# Patient Record
Sex: FEMALE | Race: WHITE
[De-identification: names, ages, dates, MRNs, and addresses within clinical notes are randomized per-mention and may not be internally consistent; named-entity substitution may affect disease eponyms.]

---

## 2022-01-24 ENCOUNTER — HOSPITAL ENCOUNTER (EMERGENCY)
Dept: HOSPITAL 33 - ED | Age: 27
Discharge: HOME | End: 2022-01-24
Payer: SELF-PAY

## 2022-01-24 ENCOUNTER — HOSPITAL ENCOUNTER (OUTPATIENT)
Dept: HOSPITAL 33 - OB | Age: 27
Setting detail: OBSERVATION
Discharge: HOME | End: 2022-01-24
Attending: OBSTETRICS & GYNECOLOGY | Admitting: OBSTETRICS & GYNECOLOGY
Payer: SELF-PAY

## 2022-01-24 VITALS — OXYGEN SATURATION: 98 %

## 2022-01-24 VITALS — DIASTOLIC BLOOD PRESSURE: 66 MMHG | SYSTOLIC BLOOD PRESSURE: 114 MMHG

## 2022-01-24 VITALS — OXYGEN SATURATION: 96 % | DIASTOLIC BLOOD PRESSURE: 59 MMHG | HEART RATE: 86 BPM | SYSTOLIC BLOOD PRESSURE: 121 MMHG

## 2022-01-24 VITALS — HEART RATE: 104 BPM

## 2022-01-24 DIAGNOSIS — R06.02: Primary | ICD-10-CM

## 2022-01-24 DIAGNOSIS — Z34.02: Primary | ICD-10-CM

## 2022-01-24 DIAGNOSIS — Z3A.26: ICD-10-CM

## 2022-01-24 DIAGNOSIS — Z33.1: ICD-10-CM

## 2022-01-24 PROCEDURE — 99283 EMERGENCY DEPT VISIT LOW MDM: CPT

## 2022-01-24 PROCEDURE — G0378 HOSPITAL OBSERVATION PER HR: HCPCS

## 2022-01-24 PROCEDURE — 94640 AIRWAY INHALATION TREATMENT: CPT

## 2022-01-24 NOTE — ERPHSYRPT
- History of Present Illness


Time Seen by Provider: 01/24/22 18:55


Source: patient


Exam Limitations: no limitations


Patient Subjective Stated Complaint: "I'm having some trouble breathing."


Triage Nursing Assessment: patient reported that she was seen at [facility] on 

1/21/22 for difficulty breathing. She reported having a CT scan and was 

diagnosed with viral pneumonia and given a roung of IV antibiotics at  ER. She

reported that since then she has not had any fevers within the last 48hrs. She 

denied exertional dyspnea, orthopnea, or productive cough. She denied chest 

pain, headache, dizziness, flank pain, N/V/D, dysuria, heamturia.  Pupils 3mm 

bilateral. Neck supple without JVD. Symmetrical chest expansion. heart tones 

S1/S2 RRR without extra sounds. Lungs vesicular with adequate airflow and no 

adventitious sounds. Peripheral pulses +3 bilateral. Gait steady without 

complications.  Ambulatory pulse oximetry was 99% and stable without worsening 

dyspnea with exertion.


Physician History: 


Patient is a 26-year-old female presents to our ED as a referral from her OB/GYN

physician for evaluation of shortness of breath.  Patient states she was 

diagnosed with viral pneumonia 3 days ago at [facility].  She was 

treated with antibiotics.  However patient has no fever.  Shortness of breath is

intermittent.  Patient states that she feels her chest is somewhat tight and 

symptoms resolved.  Patient feels very minimal shortness of breath.  Patient is 

currently 27 weeks pregnant.  Patient ambulated in our ED.  During triage it was

observed that patient had an exertional O2 sat of 97%.  Patient otherwise 

asymptomatic.





Timing/Duration: day(s) (4 days)


Severity: moderate


Modifying Factors: Improves With: nothing


Associated Symptoms: denies symptoms


Allergies/Adverse Reactions: 








No Known Drug Allergies Allergy (Verified 01/24/22 18:49)


   





Home Medications: 








Buspirone HCl 5 mg*** [Buspar 5 mg***] 10 mg PO DAILY 01/24/22 [History]





Hx Tetanus, Diphtheria Vaccination/Date Given: No


Hx Influenza Vaccination/Date Given: No





Travel Risk





- International Travel


Have you traveled outside of the country in past 3 weeks: No





- Coronavirus Screening


Are you exhibiting any of the following symptoms?: Yes


Symptoms: Shortness of Breath


Close contact with a COVID-19 positive Pt in past 14-21 Days: No





- Vaccine Status


Have you recieved a Covid-19 vaccination: No





- Review of Systems


Constitutional: No Symptoms, No Fever, No Chills


Eyes: No Symptoms


Ears, Nose, & Throat: No Symptoms


Respiratory: No Symptoms, No Cough, No Dyspnea


Cardiac: No Symptoms, No Chest Pain, No Edema, No Syncope


Abdominal/Gastrointestinal: No Symptoms, No Abdominal Pain, No Nausea, No 

Vomiting, No Diarrhea


Genitourinary Symptoms: No Symptoms, No Dysuria


Musculoskeletal: No Symptoms, No Back Pain, No Neck Pain


Skin: No Symptoms, No Rash


Neurological: No Symptoms, No Dizziness, No Focal Weakness, No Sensory Changes


Psychological: No Symptoms


Endocrine: No Symptoms


Hematologic/Lymphatic: No Symptoms


Immunological/Allergic: No Symptoms


All Other Systems: Reviewed and Negative





- Past Medical History


Pertinent Past Medical History: No





- Past Surgical History


Past Surgical History: Yes





- Social History


Smoking Status: Never smoker


Exposure to second hand smoke: No


Drug Use: none


Patient Lives Alone: No





- Female History


Hx Pregnant Now: Yes


Expected Date of Delivery: 04/24/22





- Nursing Vital Signs


Nursing Vital Signs: 





                               Initial Vital Signs











Pulse Rate  18 L  01/24/22 18:50


 


Respiratory Rate  18   01/24/22 18:50


 


Blood Pressure  132/86   01/24/22 18:50


 


O2 Sat by Pulse Oximetry  98   01/24/22 18:50








                                   Pain Scale











Pain Intensity                 0

















- Physical Exam


General Appearance: no apparent distress, alert


Eye Exam: PERRL/EOMI, eyes nml inspection


Ears, Nose, Throat Exam: normal ENT inspection, TMs normal, pharynx normal, 

moist mucous membranes


Neck Exam: normal inspection, non-tender, supple, full range of motion


Respiratory Exam: normal breath sounds, lungs clear, airway intact, No 

respiratory distress


Cardiovascular Exam: regular rate/rhythm, normal heart sounds, normal peripheral

 pulses


Gastrointestinal/Abdomen Exam: soft, normal bowel sounds, No tenderness, No mass


Back Exam: normal inspection, normal range of motion, No CVA tenderness, No 

vertebral tenderness


Extremity Exam: normal inspection, normal range of motion, pelvis stable, other 

(Negative Homans' sign bilaterally.)


Neurologic Exam: alert, oriented x 3, cooperative, normal mood/affect, nml 

cerebellar function, nml station & gait, sensation nml, No motor deficits


Skin Exam: normal color, warm, dry, No rash


Lymphatic Exam: No adenopathy


**SpO2 Interpretation**: normal


SpO2: 98


O2 Delivery: Room Air





- Course


Nursing assessment & vital signs reviewed: Yes


Ordered Tests: 





Medication Summary














Discontinued Medications














Generic Name Dose Route Start Last Admin





  Trade Name Toño  PRN Reason Stop Dose Admin


 


Albuterol/Ipratropium  3 ml  01/24/22 19:14 





  Ipratropium/Albuterol Sulfate 3 Ml Ampul.Neb    01/24/22 19:15 





  STAT ONE  














- Progress


Progress: improved


Progress Note: 


Patient reassessed.  She feels well.  Lungs are clear.  We treated patient with 

a trial of albuterol.  Symptoms significantly improved.  Case discussed with Dr. Chappell.  We will send patient home with a prescription for Ventolin albuterol 

inhaler.  No indication for further work-up.  Patient had a CAT scan done 3 days

 ago.  There will be no benefit of doing an x-ray today a repeat CAT scan.  

Patient's vitals are normal.  O2 sat is 98% during exertion and at rest.  

Shortness of breath may be due to her recently diagnosed viral pneumonia and the

 fact that she is currently pregnant.  Patient has no calf pain.  No concerns 

for DVT at this time.  Home test negative.  Will discharge home.  Patient agrees

 to follow-up with her primary care doctor/OB physician within 48 hours for 

evaluation.  Patient that she is ready for discharge.





Portions of this note were created with voice recognition technology.  There may

 be grammatical, spelling, punctuation or sound alike errors


01/24/22 19:22


Reviewed CTA chest performed on 1/21/2022 at [facility].  No thoracic

 aortic aneurysm or dissection.  Pulmonary arteries are adequately opacified.  

No pulmonary artery filling defect to suggest pulmonary embolus.  Minimal 

dependent atelectasis.  Mild respiratory motion artifact.  No airspace 

consolidation.  No pulmonary mass or suspicious calcified pulmonary nodule.  No 

pleural effusion or pneumothorax.  No lymphadenopathy.  Heart size is normal 

with no pericardial effusion.  No adenopathy.  No acute abnormality in the 

visualized upper abdomen.  Thoracic vertebrae heights are intact.  No vertebral 

compression fracture or traumatic malalignment.


01/24/22 19:25





Discussed with Dr.: Maximiliano


Will see patient in: office


Counseled pt/family regarding: diagnosis, need for follow-up, rad results





- Departure


Departure Disposition: Home


Clinical Impression: 


 Shortness of breath





Condition: Stable


Critical Care Time: No


Referrals: 


TIMBO AGUILA [Primary Care Provider] - Follow up/PCP as directed


Additional Instructions: 


Discharge/Care Plan





JACINTO GALLEGOS was seen on 01/24/22 in the Emergency Room. The patient was 

counseled regarding Diagnosis,Lab results, Imaging studies, need for follow up 

and when to return to the Emergency Room.





Prescriptions given:





Discharge Note





I have spoken with the patient and/or caregivers. I have explained the patient's

condition, diagnosis and treatment plan based on the information available to me

at this time. I have answered the patient's and/or caregiver's questions and 

addressed any concerns. The patient and/or caregivers have as good understanding

of the patient's diagnosis, condition and treatment plan as can be expected at 

this point. The vital signs have been stable. The patient's condition is stable 

and appropriate for discharge from the emergency department.





The patient will pursue further outpatient evaluation with the primary care phys

ician or other designated or consulting physician as outlined in the discharge 

instructions. The patient and/or caregivers are agreeable to this plan of care 

and follow-up instructions have been explained in detail. The patient and/or 

caregivers have received these instruction. The patient/and or caregivers are 

aware that any significant change in condition or worsening of symptoms should 

prompt an immediate return to this or the closest emergency department or call 

[phone].

## 2022-03-01 ENCOUNTER — HOSPITAL ENCOUNTER (OUTPATIENT)
Dept: HOSPITAL 33 - RAD | Age: 27
Setting detail: OBSERVATION
Discharge: HOME | End: 2022-03-01
Attending: OBSTETRICS & GYNECOLOGY | Admitting: OBSTETRICS & GYNECOLOGY
Payer: SELF-PAY

## 2022-03-01 DIAGNOSIS — Z34.03: Primary | ICD-10-CM

## 2022-03-01 DIAGNOSIS — Z3A.32: ICD-10-CM

## 2022-03-01 PROCEDURE — 76816 OB US FOLLOW-UP PER FETUS: CPT

## 2022-03-01 PROCEDURE — 59025 FETAL NON-STRESS TEST: CPT

## 2022-03-01 PROCEDURE — G0378 HOSPITAL OBSERVATION PER HR: HCPCS

## 2022-03-01 NOTE — XRAY
Indication: Fetal growth.



2-dimensional OB ultrasound performed.



Comparison: December 14, 2021



Again single intrauterine pregnancy now in cephalic presentation.  Fetal heart

rate 131 BPM.  Fetal anatomy previously documented.  Posterior fundal placenta

without abruption/previa.



BPD measures 8.38 cm corresponding to 33 weeks 5 days.

HC measures 28.70 cm corresponding to 31 weeks 4 days.

AC measures 29.30 cm corresponding to 33 weeks 2 days.

FL measures 6.16 cm corresponding to 32 weeks 0 days.

Estimated fetal weight 4 lb. 8 oz., +/- 11 ounces. Approximately 55 percentile.

ZBIGNIEW is 14.5 cm.



Impression: Single viable intrauterine pregnancy with mean gestational age 32

weeks 5 days.  Normal progression of pregnancy.

## 2022-03-08 ENCOUNTER — HOSPITAL ENCOUNTER (OUTPATIENT)
Dept: HOSPITAL 33 - OB | Age: 27
Setting detail: OBSERVATION
Discharge: HOME | End: 2022-03-08
Attending: OBSTETRICS & GYNECOLOGY | Admitting: OBSTETRICS & GYNECOLOGY
Payer: SELF-PAY

## 2022-03-08 VITALS — DIASTOLIC BLOOD PRESSURE: 79 MMHG | OXYGEN SATURATION: 97 % | SYSTOLIC BLOOD PRESSURE: 136 MMHG | HEART RATE: 93 BPM

## 2022-03-08 DIAGNOSIS — Z3A.33: ICD-10-CM

## 2022-03-08 DIAGNOSIS — Z34.03: Primary | ICD-10-CM

## 2022-03-08 PROCEDURE — 76818 FETAL BIOPHYS PROFILE W/NST: CPT

## 2022-03-08 PROCEDURE — 59025 FETAL NON-STRESS TEST: CPT

## 2022-03-08 PROCEDURE — G0378 HOSPITAL OBSERVATION PER HR: HCPCS

## 2022-03-08 NOTE — XRAY
Indication: Fetal well-being.



Ultrasound biophysical profile study performed.



Comparison: None



Single intrauterine pregnancy currently in cephalic presentation with fetal

heart rate 123 BPM.  Four-quadrant ZBIGNIEW is 13.4 cm, largest pocket 5.7 cm.



2 points given for fetal breathing, fetal movements, fetal tone, and

qualitative amniotic fluid volume.



Impression: Total biophysical profile score is 8 out of 8.

## 2022-03-11 ENCOUNTER — HOSPITAL ENCOUNTER (OUTPATIENT)
Dept: HOSPITAL 33 - OB | Age: 27
Setting detail: OBSERVATION
Discharge: HOME | End: 2022-03-11
Attending: OBSTETRICS & GYNECOLOGY | Admitting: OBSTETRICS & GYNECOLOGY
Payer: SELF-PAY

## 2022-03-11 VITALS — SYSTOLIC BLOOD PRESSURE: 133 MMHG | DIASTOLIC BLOOD PRESSURE: 86 MMHG | HEART RATE: 102 BPM

## 2022-03-11 VITALS — OXYGEN SATURATION: 98 %

## 2022-03-11 DIAGNOSIS — Z3A.33: ICD-10-CM

## 2022-03-11 DIAGNOSIS — O98.513: Primary | ICD-10-CM

## 2022-03-11 PROCEDURE — G0378 HOSPITAL OBSERVATION PER HR: HCPCS

## 2022-03-11 PROCEDURE — 59025 FETAL NON-STRESS TEST: CPT

## 2022-03-15 ENCOUNTER — HOSPITAL ENCOUNTER (OUTPATIENT)
Dept: HOSPITAL 33 - OB | Age: 27
Setting detail: OBSERVATION
Discharge: HOME | End: 2022-03-15
Attending: OBSTETRICS & GYNECOLOGY | Admitting: OBSTETRICS & GYNECOLOGY
Payer: SELF-PAY

## 2022-03-15 VITALS — HEART RATE: 90 BPM | SYSTOLIC BLOOD PRESSURE: 131 MMHG | DIASTOLIC BLOOD PRESSURE: 79 MMHG

## 2022-03-15 DIAGNOSIS — Z3A.34: ICD-10-CM

## 2022-03-15 DIAGNOSIS — O98.513: Primary | ICD-10-CM

## 2022-03-15 PROCEDURE — 59025 FETAL NON-STRESS TEST: CPT

## 2022-03-15 PROCEDURE — G0378 HOSPITAL OBSERVATION PER HR: HCPCS

## 2022-03-25 ENCOUNTER — HOSPITAL ENCOUNTER (OUTPATIENT)
Dept: HOSPITAL 33 - OB | Age: 27
Setting detail: OBSERVATION
Discharge: HOME | End: 2022-03-25
Attending: OBSTETRICS & GYNECOLOGY | Admitting: OBSTETRICS & GYNECOLOGY
Payer: SELF-PAY

## 2022-03-25 VITALS — SYSTOLIC BLOOD PRESSURE: 120 MMHG | DIASTOLIC BLOOD PRESSURE: 80 MMHG | HEART RATE: 99 BPM | OXYGEN SATURATION: 97 %

## 2022-03-25 DIAGNOSIS — Z3A.35: ICD-10-CM

## 2022-03-25 DIAGNOSIS — O98.513: Primary | ICD-10-CM

## 2022-03-25 PROCEDURE — G0378 HOSPITAL OBSERVATION PER HR: HCPCS

## 2022-03-25 PROCEDURE — 59025 FETAL NON-STRESS TEST: CPT

## 2022-03-30 ENCOUNTER — HOSPITAL ENCOUNTER (OUTPATIENT)
Dept: HOSPITAL 33 - OB | Age: 27
Setting detail: OBSERVATION
Discharge: HOME | End: 2022-03-30
Attending: OBSTETRICS & GYNECOLOGY | Admitting: OBSTETRICS & GYNECOLOGY
Payer: SELF-PAY

## 2022-03-30 VITALS — DIASTOLIC BLOOD PRESSURE: 78 MMHG | OXYGEN SATURATION: 99 % | SYSTOLIC BLOOD PRESSURE: 132 MMHG | HEART RATE: 92 BPM

## 2022-03-30 DIAGNOSIS — Z3A.36: ICD-10-CM

## 2022-03-30 DIAGNOSIS — O98.513: Primary | ICD-10-CM

## 2022-03-30 PROCEDURE — G0378 HOSPITAL OBSERVATION PER HR: HCPCS

## 2022-03-30 PROCEDURE — 59025 FETAL NON-STRESS TEST: CPT

## 2022-04-18 ENCOUNTER — HOSPITAL ENCOUNTER (INPATIENT)
Dept: HOSPITAL 33 - OB | Age: 27
LOS: 3 days | Discharge: HOME | End: 2022-04-21
Attending: OBSTETRICS & GYNECOLOGY | Admitting: OBSTETRICS & GYNECOLOGY
Payer: SELF-PAY

## 2022-04-18 DIAGNOSIS — Z3A.39: ICD-10-CM

## 2022-04-18 DIAGNOSIS — Z20.828: ICD-10-CM

## 2022-04-18 LAB
AMPHETAMINES UR QL: NEGATIVE
BARBITURATES UR QL: NEGATIVE
BENZODIAZ UR QL SCN: NEGATIVE
COCAINE UR QL SCN: NEGATIVE
HCT VFR BLD AUTO: 40.1 % (ref 35–47)
HGB BLD-MCNC: 13.2 GM/DL (ref 12–16)
MCH RBC QN AUTO: 31.3 PG (ref 26–32)
MCHC RBC AUTO-ENTMCNC: 32.9 G/DL (ref 32–36)
METHADONE UR QL: NEGATIVE
OPIATES UR QL: NEGATIVE
PCP UR QL CFM>20 NG/ML: NEGATIVE
PLATELET # BLD AUTO: 181 K/MM3 (ref 150–450)
RBC # BLD AUTO: 4.22 M/MM3 (ref 4.1–5.4)
THC UR QL SCN: NEGATIVE
WBC # BLD AUTO: 15.4 K/MM3 (ref 4–10.5)

## 2022-04-18 PROCEDURE — 80307 DRUG TEST PRSMV CHEM ANLYZR: CPT

## 2022-04-18 PROCEDURE — 87086 URINE CULTURE/COLONY COUNT: CPT

## 2022-04-18 PROCEDURE — 36415 COLL VENOUS BLD VENIPUNCTURE: CPT

## 2022-04-18 PROCEDURE — 85025 COMPLETE CBC W/AUTO DIFF WBC: CPT

## 2022-04-18 PROCEDURE — 84112 EVAL AMNIOTIC FLUID PROTEIN: CPT

## 2022-04-18 PROCEDURE — 87340 HEPATITIS B SURFACE AG IA: CPT

## 2022-04-18 PROCEDURE — 90715 TDAP VACCINE 7 YRS/> IM: CPT

## 2022-04-18 PROCEDURE — G0378 HOSPITAL OBSERVATION PER HR: HCPCS

## 2022-04-18 PROCEDURE — 59409 OBSTETRICAL CARE: CPT

## 2022-04-19 LAB
CELLS COUNTED: 100
MANUAL DIF COMMENT BLD-IMP: NORMAL
NEUTS BAND # BLD MANUAL: 1 % (ref 0–2)

## 2022-04-19 PROCEDURE — 0KQM0ZZ REPAIR PERINEUM MUSCLE, OPEN APPROACH: ICD-10-PCS | Performed by: OBSTETRICS & GYNECOLOGY

## 2022-04-19 RX ADMIN — ACETAMINOPHEN PRN MG: 500 TABLET ORAL at 14:53

## 2022-04-19 RX ADMIN — DOCUSATE SODIUM SCH MG: 100 CAPSULE, LIQUID FILLED ORAL at 21:50

## 2022-04-19 RX ADMIN — ACETAMINOPHEN PRN MG: 500 TABLET ORAL at 21:48

## 2022-04-19 RX ADMIN — IBUPROFEN PRN MG: 400 TABLET ORAL at 17:34

## 2022-04-19 RX ADMIN — IBUPROFEN PRN MG: 400 TABLET ORAL at 11:37

## 2022-04-20 VITALS — OXYGEN SATURATION: 98 %

## 2022-04-20 LAB
BASOPHILS # BLD AUTO: 0.02 10*3/UL (ref 0–0.4)
BLD SMEAR INTERP: YES
EOSINOPHIL # BLD AUTO: 0.16 10*3/UL (ref 0–0.5)
HCT VFR BLD AUTO: 34.7 % (ref 35–47)
HGB BLD-MCNC: 11.2 GM/DL (ref 12–16)
LYMPHOCYTES # SPEC AUTO: 2.33 10*3/UL (ref 1–4.6)
MCH RBC QN AUTO: 31.1 PG (ref 26–32)
MCHC RBC AUTO-ENTMCNC: 32.3 G/DL (ref 32–36)
MONOCYTES # BLD AUTO: 1.73 10*3/UL (ref 0–1.3)
PLATELET # BLD AUTO: 167 K/MM3 (ref 150–450)
RBC # BLD AUTO: 3.6 M/MM3 (ref 4.1–5.4)
WBC # BLD AUTO: 20.6 K/MM3 (ref 4–10.5)

## 2022-04-20 RX ADMIN — Medication SCH MG: at 08:53

## 2022-04-20 RX ADMIN — IBUPROFEN PRN MG: 400 TABLET ORAL at 20:40

## 2022-04-20 RX ADMIN — ACETAMINOPHEN PRN MG: 500 TABLET ORAL at 04:50

## 2022-04-20 RX ADMIN — ACETAMINOPHEN PRN MG: 500 TABLET ORAL at 11:10

## 2022-04-20 RX ADMIN — ACETAMINOPHEN PRN MG: 500 TABLET ORAL at 23:02

## 2022-04-20 RX ADMIN — DOCUSATE SODIUM SCH MG: 100 CAPSULE, LIQUID FILLED ORAL at 21:10

## 2022-04-20 RX ADMIN — IBUPROFEN PRN MG: 400 TABLET ORAL at 14:54

## 2022-04-20 RX ADMIN — ACETAMINOPHEN PRN MG: 500 TABLET ORAL at 17:55

## 2022-04-20 RX ADMIN — IBUPROFEN PRN MG: 400 TABLET ORAL at 00:09

## 2022-04-20 RX ADMIN — BUSPIRONE HYDROCHLORIDE SCH MG: 5 TABLET ORAL at 20:39

## 2022-04-20 RX ADMIN — DOCUSATE SODIUM SCH MG: 100 CAPSULE, LIQUID FILLED ORAL at 08:53

## 2022-04-20 RX ADMIN — IBUPROFEN PRN MG: 400 TABLET ORAL at 08:53

## 2022-04-20 NOTE — PCM.NOTE
Date and Time: 22  0741





Subjective Assessment: 





ppd 1


pt resting in bed and doing well


vss afebrile


abd; soft


uterus; firm


lochia; mild





hgb; 11





a/p sp  ppd 1 


  doing well


  anticipate discharge tomorrow





OBJECTIVE DATA


Vital Signs: 


                               Vital Signs - 24 hr











  Temp Pulse Resp BP BP Pulse Ox


 


 22 03:00  98.6 F  79  18   97/53  98


 


 22 21:00  98.7 F  88  18   106/51 


 


 22 15:00   80  16   116/54  97


 


 22 12:45   77  18   115/56 


 


 22 11:45   97 H  18   137/67 


 


 22 11:25   97 H  20   143/72 


 


 22 11:10   100 H  20   142/73 


 


 22 11:00   100 H  18   115/56 


 


 22 10:55   105 H  24   115/64 


 


 22 10:40   96 H  24   120/64 


 


 22 10:27   116 H  24   


 


 22 10:12   116 H  22   


 


 22 09:57   80  22  112/58  


 


 22 09:42   87  22  133/70  


 


 22 09:27   94 H  22  129/79  


 


 22 09:00   93 H  16  121/72  


 


 22 08:45   90  18  125/71  


 


 22 08:30   92 H  18  103/58  


 


 22 08:15   81  18  100/58  


 


 22 08:00   98 H  18  118/78  








                        Pain Assessment - Last Documented











Pain Intensity [Lower Anterior 2





]                              


 


Pain Intensity                 3


 


Pain Scale Used                0-10 Pain Scale











Intake and Output: 


                                 Intake & Output











 22





 11:59 11:59 11:59 11:59


 


Intake Total   2825 2800


 


Output Total   900 400


 


Balance   1925 2400


 


Weight   76.657 kg 











Lab Results: 


                            Lab Results-Last 24 Hours











  22 Range/Units





  04:40 


 


WBC  20.6 H  (4.0-10.5)  K/mm3


 


RBC  3.60 L  (4.1-5.4)  M/mm3


 


Hgb  11.2 L  (12.0-16.0)  gm/dl


 


Hct  34.7 L  (35-47)  %


 


MCV  96.4  ()  fl


 


MCH  31.1  (26-32)  pg


 


MCHC  32.3  (32-36)  g/dl


 


RDW  13.1  (11.5-14.0)  %


 


Plt Count  167  (150-450)  K/mm3


 


MPV  11.2 H  (7.5-11.0)  fl


 


Gran %  79.4 H  (36.0-66.0)  %


 


Eos # (Auto)  0.16  (0-0.5)  


 


Absolute Lymphs (auto)  2.33  (1.0-4.6)  


 


Absolute Monos (auto)  1.73 H  (0.0-1.3)  


 


Lymphocytes %  11.3 L  (24.0-44.0)  %


 


Monocytes %  8.4  (0.0-12.0)  %


 


Eosinophils %  0.8  (0.00-5.0)  %


 


Basophils %  0.1  (0.0-0.4)  %


 


Absolute Granulocytes  16.33 H  (1.4-6.9)  


 


Basophils #  0.02  (0-0.4)  














Assessment/Plan


(1) Vaginal delivery


Current Visit: Yes   Status: Acute   Code(s): O80 - ENCOUNTER FOR FULL-TERM 

UNCOMPLICATED DELIVERY

## 2022-04-21 VITALS — DIASTOLIC BLOOD PRESSURE: 68 MMHG | SYSTOLIC BLOOD PRESSURE: 110 MMHG

## 2022-04-21 VITALS — HEART RATE: 73 BPM

## 2022-04-21 RX ADMIN — IBUPROFEN PRN MG: 400 TABLET ORAL at 08:40

## 2022-04-21 RX ADMIN — DOCUSATE SODIUM SCH MG: 100 CAPSULE, LIQUID FILLED ORAL at 08:40

## 2022-04-21 RX ADMIN — IBUPROFEN PRN MG: 400 TABLET ORAL at 02:09

## 2022-04-21 RX ADMIN — Medication SCH MG: at 08:40

## 2022-04-21 RX ADMIN — BUSPIRONE HYDROCHLORIDE SCH MG: 5 TABLET ORAL at 08:41

## 2022-04-21 RX ADMIN — ACETAMINOPHEN PRN MG: 500 TABLET ORAL at 06:43

## 2022-04-21 NOTE — PCM.DS
Discharge Summary


Date of Admission: 


22 00:50





Admitting Physician: 


ISLEA FAN DO





Consults: 





                                Consults on Case





22 02:34


Notify  Anesthesia Provider PRN 





22 15:05


 Navigation ONCE 











Primary Care Provider: 


TIMBO AGUILA








Allergies


Allergies





No Known Drug Allergies Allergy (Verified 22 13:17)


   











Hospital Summary





- Hospital Course


Hospital Course: 





pt admitted on  for cytotec induction and delivered live baby boy via 

 without complication on .  during postpartum period did well with 

stable hgb at 10 and now stable for discharge.  all questions answered to her 

satisfaction and was advised to fu in office in 3 wks.  





- Vitals & Intake/Output


Vital Signs: 





                                   Vital Signs











Temperature  97.8 F   22 03:00


 


Pulse Rate  73   22 03:00


 


Respiratory Rate  18   22 03:00


 


Blood Pressure  107/68   22 03:00


 


O2 Sat by Pulse Oximetry  98   22 20:56











Intake & Output: 





                                 Intake & Output











 22





 11:59 11:59 11:59 11:59


 


Intake Total  2825 2800 


 


Output Total  900 400 


 


Balance  1925 2400 


 


Weight  76.657 kg  














- Lab


Result Diagrams: 


                                 22 04:40





Lab Results-Last 24 Hrs: 





                            Lab Results-Last 24 Hours











  22 Range/Units





  19:30 04:40 


 


Hep Bs Antigen  Negative   (Negative)  


 


Slides for Path Review   YES  











Micro Results-Entire Visit: 





                                  Microbiology











 22 04:00 Urine Culture - Preliminary





 Catherized    NO GROWTH TO DATE














Final Diagnosis/Problem List





- Final Discharge Diagnosis/Problem


(1) Vaginal delivery


Current Visit: Yes   Status: Acute   Code(s): O80 - ENCOUNTER FOR FULL-TERM 

UNCOMPLICATED DELIVERY   





- Discharge


Disposition: Home, Self-Care


Condition: Stable


Prescriptions: 


No Action


   Buspirone HCl 5 mg*** [Buspar 5 mg***] 10 mg PO DAILY


Additional Instructions: 


YOU MAY ALTERNATE TYLENOL 1,000 MG EVERY 4 HOURS AND IBPROFEN (MOTRIN) 800 MG 

EVERY 6 HOURS FOR PAIN RELIEF. 


Follow up with: 


TIMBO AGUILA [Primary Care Provider] - 


ISELA FAN DO [ACTIVE STAFF] - 3 weeks (fu office in 3 wks)

## 2022-04-21 NOTE — PCM.NOTE
Date and Time: 22  0734





Subjective Assessment: 





ppd 2


pt resting in bed and doing well


vss afebrile


abd; soft


uterus; firm


lochia; mild





a/p sp  ppd 2


  dc home today


  fu office in 3 wks





OBJECTIVE DATA


Vital Signs: 


                               Vital Signs - 24 hr











  Temp Pulse Resp BP Pulse Ox


 


 22 03:00  97.8 F  73  18  107/68 


 


 22 20:56  97.9 F  95 H  20  116/66  98


 


 22 15:00  97.9 F  95 H  20  116/66  98


 


 22 09:00  98 F   20  








                        Pain Assessment - Last Documented











Pain Intensity [Lower Anterior 2





]                              


 


Pain Intensity                 2


 


Pain Scale Used                0-10 Pain Scale











Intake and Output: 


                                 Intake & Output











 22





 11:59 11:59 11:59 11:59


 


Intake Total  2825 2800 


 


Output Total  900 400 


 


Balance  1925 2400 


 


Weight  76.657 kg  











Lab Results: 


                            Lab Results-Last 24 Hours











  22 Range/Units





  19:30 04:40 


 


Hep Bs Antigen  Negative   (Negative)  


 


Slides for Path Review   YES  














Assessment/Plan


(1) Vaginal delivery


Current Visit: Yes   Status: Acute   Code(s): O80 - ENCOUNTER FOR FULL-TERM 

UNCOMPLICATED DELIVERY